# Patient Record
Sex: MALE | Race: ASIAN | NOT HISPANIC OR LATINO | URBAN - METROPOLITAN AREA
[De-identification: names, ages, dates, MRNs, and addresses within clinical notes are randomized per-mention and may not be internally consistent; named-entity substitution may affect disease eponyms.]

---

## 2024-01-04 ENCOUNTER — APPOINTMENT (RX ONLY)
Dept: URBAN - METROPOLITAN AREA CLINIC 41 | Facility: CLINIC | Age: 44
Setting detail: DERMATOLOGY
End: 2024-01-04

## 2024-01-04 DIAGNOSIS — L30.9 DERMATITIS, UNSPECIFIED: ICD-10-CM | Status: INADEQUATELY CONTROLLED

## 2024-01-04 PROCEDURE — 99203 OFFICE O/P NEW LOW 30 MIN: CPT

## 2024-01-04 PROCEDURE — ? COUNSELING

## 2024-01-04 PROCEDURE — ? TREATMENT REGIMEN

## 2024-01-04 PROCEDURE — ? PRESCRIPTION

## 2024-01-04 PROCEDURE — ? PRESCRIPTION MEDICATION MANAGEMENT

## 2024-01-04 RX ORDER — HYDROXYZINE HYDROCHLORIDE 25 MG/1
TABLET, FILM COATED ORAL
Qty: 30 | Refills: 1 | Status: ERX | COMMUNITY
Start: 2024-01-04

## 2024-01-04 RX ORDER — TRIAMCINOLONE ACETONIDE 1 MG/G
OINTMENT TOPICAL
Qty: 30 | Refills: 1 | Status: ERX | COMMUNITY
Start: 2024-01-04

## 2024-01-04 RX ORDER — LORATADINE 10 MG/1
TABLET ORAL
Qty: 30 | Refills: 1 | Status: ERX | COMMUNITY
Start: 2024-01-04

## 2024-01-04 RX ADMIN — LORATADINE: 10 TABLET ORAL at 00:00

## 2024-01-04 RX ADMIN — HYDROXYZINE HYDROCHLORIDE: 25 TABLET, FILM COATED ORAL at 00:00

## 2024-01-04 RX ADMIN — TRIAMCINOLONE ACETONIDE: 1 OINTMENT TOPICAL at 00:00

## 2024-01-04 ASSESSMENT — LOCATION DETAILED DESCRIPTION DERM
LOCATION DETAILED: RIGHT PROXIMAL PRETIBIAL REGION
LOCATION DETAILED: RIGHT VENTRAL PROXIMAL FOREARM
LOCATION DETAILED: LEFT VENTRAL PROXIMAL FOREARM
LOCATION DETAILED: LEFT PROXIMAL PRETIBIAL REGION

## 2024-01-04 ASSESSMENT — LOCATION SIMPLE DESCRIPTION DERM
LOCATION SIMPLE: RIGHT FOREARM
LOCATION SIMPLE: LEFT PRETIBIAL REGION
LOCATION SIMPLE: RIGHT PRETIBIAL REGION
LOCATION SIMPLE: LEFT FOREARM

## 2024-01-04 ASSESSMENT — LOCATION ZONE DERM
LOCATION ZONE: LEG
LOCATION ZONE: ARM

## 2024-01-04 NOTE — PROCEDURE: PRESCRIPTION MEDICATION MANAGEMENT
Detail Level: Zone
Render In Strict Bullet Format?: No
Initiate Treatment: Triamcinolone .1 bid x 2 weeks\\nClaritin qam \\nHydroxyzine qhs

## 2024-01-04 NOTE — HPI: RASH
Is This A New Presentation, Or A Follow-Up?: Rash
Additional History: -\\nNew pt. \\nPt reports itchy rash on arms, legs, and face for 3 months. \\nPt tried triamcinolone .1 from PCP for 2-3 weeks without success 1 month ago.\\n\\nDecline FBSE

## 2024-01-04 NOTE — PROCEDURE: COUNSELING
Detail Level: Zone
Patient Specific Counseling (Will Not Stick From Patient To Patient): -\\nBG notes it is obvious pt is scratching and counsels triamcinolone should have worked